# Patient Record
Sex: MALE | URBAN - METROPOLITAN AREA
[De-identification: names, ages, dates, MRNs, and addresses within clinical notes are randomized per-mention and may not be internally consistent; named-entity substitution may affect disease eponyms.]

---

## 2023-05-01 ENCOUNTER — TRANSFERRED RECORDS (OUTPATIENT)
Dept: HEALTH INFORMATION MANAGEMENT | Facility: CLINIC | Age: 68
End: 2023-05-01

## 2023-06-12 ENCOUNTER — MEDICAL CORRESPONDENCE (OUTPATIENT)
Dept: HEALTH INFORMATION MANAGEMENT | Facility: CLINIC | Age: 68
End: 2023-06-12
Payer: MEDICARE

## 2023-06-12 ENCOUNTER — TRANSFERRED RECORDS (OUTPATIENT)
Dept: HEALTH INFORMATION MANAGEMENT | Facility: CLINIC | Age: 68
End: 2023-06-12
Payer: MEDICARE

## 2023-06-14 ENCOUNTER — TRANSCRIBE ORDERS (OUTPATIENT)
Dept: OTHER | Age: 68
End: 2023-06-14

## 2023-06-14 DIAGNOSIS — N40.1 BPH WITH OBSTRUCTION/LOWER URINARY TRACT SYMPTOMS: Primary | ICD-10-CM

## 2023-06-14 DIAGNOSIS — N13.8 BPH WITH OBSTRUCTION/LOWER URINARY TRACT SYMPTOMS: Primary | ICD-10-CM

## 2023-06-29 ENCOUNTER — PRE VISIT (OUTPATIENT)
Dept: UROLOGY | Facility: CLINIC | Age: 68
End: 2023-06-29
Payer: MEDICARE

## 2023-06-29 NOTE — TELEPHONE ENCOUNTER
Reason for visit: Consult; discuss HoLEP    Dx/Hx/Sx: BPH    Records/imaging/labs/orders: In EPIC    At Room[facility]: paper AUA; collect urine; PVR    Kvng Mancera, EMT  06/29/23  2:35 PM

## 2023-07-19 NOTE — TELEPHONE ENCOUNTER
Action 2023 JTV 3:38 PM    Action Taken CSS call [facility] requesting images.    [facility] sent an urgent request to [place] for images to be pushed.      Action 2023 Jtv 10:20 PM    Action Taken CSS received and resolved images from [place].     MEDICAL RECORDS REQUEST   [facility]  [facility]  [address] [place] [address]  PHONE: [phone]  Fax: [phone]        FUTURE VISIT INFORMATION                                                   Samuel Garland, : 1955 scheduled for future visit at [facility]    APPOINTMENT INFORMATION:  Date: 2023  Provider:  Kenney Meek MD  Reason for Visit/Diagnosis: BPH with obstruction/lower urinary tract symptoms    REFERRAL INFORMATION:  Referring provider:  Dr. Carlos Chacko at [facility] contact number:  Ref phone: [phone] Ref fax: [phone]    RECORDS REQUESTED FOR VISIT                                                     NOTES  STATUS/DETAILS   OFFICE NOTE from referring provider  yes, 2023, 2022, 2021 -- Dr. Carlos Chacko at [facility]   MEDICATION LIST  yes   LABS     URINALYSIS (UA)  yes   PSA  yes   BENIGN PROSTATIC HYPERPLASIA (BPH)     MRI OF PROSTATE (IMAGES & REPORT)  yes, [place]  05/15/2023 -- MR PROSTATE      [facility]  2022 -- MR PROSTATE   PSA  yes     PRE-VISIT CHECKLIST      Joint diagnostic appointment coordinated correctly          (ensure right order & amount of time) Yes   RECORD COLLECTION COMPLETE yes

## 2023-08-29 ENCOUNTER — PRE VISIT (OUTPATIENT)
Dept: UROLOGY | Facility: CLINIC | Age: 68
End: 2023-08-29